# Patient Record
Sex: FEMALE | Race: WHITE | NOT HISPANIC OR LATINO | Employment: UNEMPLOYED | URBAN - METROPOLITAN AREA
[De-identification: names, ages, dates, MRNs, and addresses within clinical notes are randomized per-mention and may not be internally consistent; named-entity substitution may affect disease eponyms.]

---

## 2022-08-31 ENCOUNTER — OFFICE VISIT (OUTPATIENT)
Dept: CARDIOLOGY | Facility: CLINIC | Age: 57
End: 2022-08-31

## 2022-08-31 VITALS
HEIGHT: 60 IN | DIASTOLIC BLOOD PRESSURE: 65 MMHG | SYSTOLIC BLOOD PRESSURE: 114 MMHG | BODY MASS INDEX: 23.75 KG/M2 | WEIGHT: 121 LBS | HEART RATE: 61 BPM | OXYGEN SATURATION: 100 %

## 2022-08-31 DIAGNOSIS — I20.8 STABLE ANGINA PECTORIS: Primary | ICD-10-CM

## 2022-08-31 DIAGNOSIS — I10 PRIMARY HYPERTENSION: ICD-10-CM

## 2022-08-31 PROCEDURE — 99203 OFFICE O/P NEW LOW 30 MIN: CPT | Performed by: INTERNAL MEDICINE

## 2022-08-31 RX ORDER — LEVOTHYROXINE SODIUM 0.12 MG/1
125 TABLET ORAL DAILY
COMMUNITY

## 2022-08-31 RX ORDER — LORAZEPAM 0.5 MG/1
0.5 TABLET ORAL EVERY 8 HOURS PRN
COMMUNITY

## 2022-08-31 RX ORDER — AMLODIPINE BESYLATE 5 MG/1
TABLET ORAL DAILY
COMMUNITY
Start: 2019-02-19

## 2022-08-31 RX ORDER — ALBUTEROL SULFATE 90 UG/1
2 AEROSOL, METERED RESPIRATORY (INHALATION) EVERY 4 HOURS PRN
COMMUNITY

## 2022-08-31 RX ORDER — MIRTAZAPINE 30 MG/1
30 TABLET, FILM COATED ORAL NIGHTLY
COMMUNITY

## 2022-08-31 RX ORDER — BACLOFEN 10 MG/1
10 TABLET ORAL DAILY PRN
COMMUNITY
Start: 2019-02-19

## 2022-08-31 RX ORDER — LORATADINE 10 MG/1
10 CAPSULE, LIQUID FILLED ORAL
COMMUNITY

## 2022-08-31 RX ORDER — MULTIPLE VITAMINS W/ MINERALS TAB 9MG-400MCG
1 TAB ORAL DAILY
COMMUNITY

## 2022-08-31 RX ORDER — NITROGLYCERIN 0.4 MG/1
0.4 TABLET SUBLINGUAL
COMMUNITY

## 2022-08-31 RX ORDER — TOPIRAMATE 50 MG/1
50 TABLET, FILM COATED ORAL 2 TIMES DAILY
COMMUNITY

## 2022-08-31 RX ORDER — CELECOXIB 200 MG/1
CAPSULE ORAL DAILY
COMMUNITY
Start: 2019-02-19

## 2022-08-31 RX ORDER — BUTALBITAL AND ACETAMINOPHEN 50; 325 MG/1; MG/1
TABLET ORAL EVERY 6 HOURS PRN
COMMUNITY

## 2022-08-31 RX ORDER — ESTRADIOL 2 MG/1
TABLET ORAL EVERY 24 HOURS
COMMUNITY
Start: 2015-03-10

## 2022-08-31 RX ORDER — ATENOLOL 25 MG/1
TABLET ORAL EVERY 24 HOURS
COMMUNITY
Start: 2019-02-19

## 2022-09-08 DIAGNOSIS — R07.9 CHRONIC CHEST PAIN WITH LOW TO MODERATE RISK FOR CAD: ICD-10-CM

## 2022-09-08 DIAGNOSIS — I10 ESSENTIAL HYPERTENSION: ICD-10-CM

## 2022-09-08 DIAGNOSIS — R94.31 ABNORMAL EKG: ICD-10-CM

## 2022-09-08 DIAGNOSIS — Z91.89 CHRONIC CHEST PAIN WITH LOW TO MODERATE RISK FOR CAD: ICD-10-CM

## 2022-09-08 DIAGNOSIS — G89.29 CHRONIC CHEST PAIN WITH LOW TO MODERATE RISK FOR CAD: ICD-10-CM

## 2022-09-08 DIAGNOSIS — I20.8 CHRONIC STABLE ANGINA: Primary | ICD-10-CM

## 2022-09-21 ENCOUNTER — APPOINTMENT (OUTPATIENT)
Dept: CARDIOLOGY | Facility: HOSPITAL | Age: 57
End: 2022-09-21

## 2024-03-06 ENCOUNTER — OFFICE (OUTPATIENT)
Dept: URBAN - METROPOLITAN AREA CLINIC 76 | Facility: CLINIC | Age: 59
End: 2024-03-06

## 2024-03-06 VITALS
HEART RATE: 57 BPM | WEIGHT: 123 LBS | OXYGEN SATURATION: 99 % | DIASTOLIC BLOOD PRESSURE: 62 MMHG | SYSTOLIC BLOOD PRESSURE: 102 MMHG | HEIGHT: 60 IN

## 2024-03-06 DIAGNOSIS — R19.4 CHANGE IN BOWEL HABIT: ICD-10-CM

## 2024-03-06 DIAGNOSIS — R19.7 DIARRHEA, UNSPECIFIED: ICD-10-CM

## 2024-03-06 PROCEDURE — 99204 OFFICE O/P NEW MOD 45 MIN: CPT | Performed by: INTERNAL MEDICINE

## 2024-03-06 NOTE — SERVICEHPINOTES
Ms. Vo is a pleasant 59-year-old female with past 2-3 years is had diarrhea, she'll then have constipation and not have a bowel movement and then the symptoms relapse.  She has diarrhea episodes 3 days a week.  She believes a lot of her symptoms started after the Covid vaccine.  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  She denies weight loss.  
br
br She has no upper GI complaints.  Is no reflux, there is no dysphagia, odynophagia nausea or vomiting.  There is no documented fevers or chills, she has subjective fevers.  There is no blood in the bowels.  She was on some medications which she says cause diarrhea at one point, the medicines were stopped but she doesn't recall what medicines she was on.  Family history is negative for polyps or colon cancer.  There is no travel or well water use.  No one else is been ill.  There is no bleeding or nocturnal symptoms.  Her CRP and GAYATRI are elevated consistent with her history of autoimmune disease.  She doesn't drink or smoke.  She's had prior hysterectomy.  She is in no acute distress.

## 2024-04-04 ENCOUNTER — PROCEDURE VISIT (OUTPATIENT)
Dept: NEUROLOGY | Facility: CLINIC | Age: 59
End: 2024-04-04
Payer: COMMERCIAL

## 2024-04-04 VITALS
HEIGHT: 60 IN | DIASTOLIC BLOOD PRESSURE: 86 MMHG | BODY MASS INDEX: 23.75 KG/M2 | HEART RATE: 63 BPM | WEIGHT: 121 LBS | SYSTOLIC BLOOD PRESSURE: 121 MMHG

## 2024-04-04 DIAGNOSIS — M79.641 PAIN IN BOTH HANDS: Primary | ICD-10-CM

## 2024-04-04 DIAGNOSIS — M79.642 PAIN IN BOTH HANDS: Primary | ICD-10-CM

## 2024-04-04 DIAGNOSIS — R29.898 WEAKNESS OF BOTH UPPER EXTREMITIES: ICD-10-CM

## 2024-04-04 PROBLEM — G47.00 INSOMNIA: Status: ACTIVE | Noted: 2017-12-28

## 2024-04-04 PROBLEM — K58.9 IRRITABLE BOWEL SYNDROME: Status: ACTIVE | Noted: 2024-04-04

## 2024-04-04 PROBLEM — G43.119 INTRACTABLE MIGRAINE WITH AURA WITHOUT STATUS MIGRAINOSUS: Status: ACTIVE | Noted: 2018-03-26

## 2024-04-04 PROBLEM — F32.9 MAJOR DEPRESSIVE DISORDER, SINGLE EPISODE, UNSPECIFIED: Status: ACTIVE | Noted: 2024-04-04

## 2024-04-04 PROBLEM — K50.90 CROHN'S DISEASE: Status: ACTIVE | Noted: 2024-04-04

## 2024-04-04 PROBLEM — E07.9 THYROID DISEASE: Status: ACTIVE | Noted: 2024-04-04

## 2024-04-04 PROBLEM — G44.40 MEDICATION OVERUSE HEADACHE: Status: ACTIVE | Noted: 2018-03-26

## 2024-04-04 PROBLEM — I10 HYPERTENSION: Status: ACTIVE | Noted: 2019-02-19

## 2024-04-04 RX ORDER — FERROUS SULFATE 325(65) MG
1 TABLET ORAL EVERY 12 HOURS SCHEDULED
COMMUNITY
Start: 2024-03-20

## 2024-04-04 RX ORDER — FLUOXETINE HYDROCHLORIDE 40 MG/1
1 CAPSULE ORAL DAILY
COMMUNITY
Start: 2024-02-01

## 2024-04-04 RX ORDER — PRAVASTATIN SODIUM 10 MG
TABLET ORAL
COMMUNITY

## 2024-04-04 RX ORDER — RIZATRIPTAN BENZOATE 10 MG/1
TABLET ORAL
COMMUNITY
Start: 2024-03-12

## 2025-02-12 ENCOUNTER — APPOINTMENT (OUTPATIENT)
Dept: CT IMAGING | Facility: HOSPITAL | Age: 60
End: 2025-02-12
Payer: COMMERCIAL

## 2025-02-12 ENCOUNTER — APPOINTMENT (OUTPATIENT)
Dept: GENERAL RADIOLOGY | Facility: HOSPITAL | Age: 60
End: 2025-02-12
Payer: COMMERCIAL

## 2025-02-12 ENCOUNTER — HOSPITAL ENCOUNTER (EMERGENCY)
Facility: HOSPITAL | Age: 60
Discharge: HOME | End: 2025-02-12
Attending: STUDENT IN AN ORGANIZED HEALTH CARE EDUCATION/TRAINING PROGRAM | Admitting: STUDENT IN AN ORGANIZED HEALTH CARE EDUCATION/TRAINING PROGRAM
Payer: COMMERCIAL

## 2025-02-12 VITALS
HEIGHT: 60 IN | DIASTOLIC BLOOD PRESSURE: 80 MMHG | TEMPERATURE: 98 F | RESPIRATION RATE: 16 BRPM | SYSTOLIC BLOOD PRESSURE: 117 MMHG | HEART RATE: 76 BPM | WEIGHT: 100 LBS | OXYGEN SATURATION: 99 % | BODY MASS INDEX: 19.63 KG/M2

## 2025-02-12 DIAGNOSIS — S43.401A SPRAIN OF RIGHT SHOULDER, UNSPECIFIED SHOULDER SPRAIN TYPE, INITIAL ENCOUNTER: ICD-10-CM

## 2025-02-12 DIAGNOSIS — S09.90XA INJURY OF HEAD, INITIAL ENCOUNTER: Primary | ICD-10-CM

## 2025-02-12 DIAGNOSIS — S13.9XXA CERVICAL SPRAIN, INITIAL ENCOUNTER: ICD-10-CM

## 2025-02-12 DIAGNOSIS — S53.401A SPRAIN OF RIGHT ELBOW, INITIAL ENCOUNTER: ICD-10-CM

## 2025-02-12 PROCEDURE — 72125 CT NECK SPINE W/O DYE: CPT

## 2025-02-12 PROCEDURE — 73080 X-RAY EXAM OF ELBOW: CPT

## 2025-02-12 PROCEDURE — 63710000001 ONDANSETRON ODT 4 MG TABLET DISPERSIBLE: Performed by: STUDENT IN AN ORGANIZED HEALTH CARE EDUCATION/TRAINING PROGRAM

## 2025-02-12 PROCEDURE — 70450 CT HEAD/BRAIN W/O DYE: CPT

## 2025-02-12 PROCEDURE — 73030 X-RAY EXAM OF SHOULDER: CPT

## 2025-02-12 PROCEDURE — 99284 EMERGENCY DEPT VISIT MOD MDM: CPT | Performed by: STUDENT IN AN ORGANIZED HEALTH CARE EDUCATION/TRAINING PROGRAM

## 2025-02-12 RX ORDER — PREDNISONE 20 MG/1
TABLET ORAL
Qty: 18 TABLET | Refills: 0 | Status: SHIPPED | OUTPATIENT
Start: 2025-02-12 | End: 2025-02-20

## 2025-02-12 RX ORDER — ONDANSETRON 4 MG/1
4 TABLET, ORALLY DISINTEGRATING ORAL ONCE
Status: COMPLETED | OUTPATIENT
Start: 2025-02-12 | End: 2025-02-12

## 2025-02-12 RX ORDER — HYDROCODONE BITARTRATE AND ACETAMINOPHEN 5; 325 MG/1; MG/1
1 TABLET ORAL ONCE
Status: COMPLETED | OUTPATIENT
Start: 2025-02-12 | End: 2025-02-12

## 2025-02-12 RX ADMIN — HYDROCODONE BITARTRATE AND ACETAMINOPHEN 1 TABLET: 5; 325 TABLET ORAL at 18:57

## 2025-02-12 RX ADMIN — ONDANSETRON 4 MG: 4 TABLET, ORALLY DISINTEGRATING ORAL at 18:57

## 2025-02-12 NOTE — ED PROVIDER NOTES
Subjective   History of Present Illness  60-year-old female presents to the ER stating that approximately 4 months ago she fell off of a ladder on the 10th rung.  When she fell off she got her arm and her leg wrapped up in the ladder and she fell all the way down.  She has had neck pain initially when she most likely hit her head and now she has pain that radiates from her shoulder down past her elbow.  She has not seen anybody for this.  Not on any blood thinners.  Currently allergic to Augmentin.  No vision changes are noted.  No nausea or vomiting.        Review of Systems no fever chills cough congestion chest pain or shortness of breath.  No nausea vomiting diarrhea or abdominal pain.  Positive neck pain shoulder pain and right elbow pain with radicular-like symptoms.    Past Medical History:   Diagnosis Date    Abnormal ECG     Hypertension     Lupus     Raynaud's disease        Allergies   Allergen Reactions    Augmentin [Amoxicillin-Pot Clavulanate] Hives and GI Intolerance       Past Surgical History:   Procedure Laterality Date    HYSTERECTOMY         Family History   Problem Relation Age of Onset    Heart attack Mother        Social History     Socioeconomic History    Marital status:    Tobacco Use    Smoking status: Former     Current packs/day: 0.00     Types: Cigarettes     Quit date:      Years since quittin.1           Objective   Physical Exam  General: Mild distress  Head: Atraumatic normocephalic, no crepitus pressure hematoma  EENT: TMs and external auditory canals are without erythema edema or exudate.  EOMI PERRL.  Nares patent.  Tonsils are without erythema edema or exudate bilaterally and uvula is midline.  Neck: No lymphadenopathy.  Spine: Cervical spine tender to palpation over C4-5-6 without step-off or deformity.  Thoracic and lumbar spines are without step-off deformity or crepitus.  Chest: Clear to auscultation bilaterally without wheezing rhonchi or  rales  Cardiovascular: Regular rate and rhythm without murmur no edema  Abdominal: Soft nontender good bowel sounds no hernia  Skin: No abrasions bruising or lesions.  Musculoskeletal: Full active range of motion of upper and lower extremities with good distal pulses.  Right shoulder tender to palpation anteriorly without gross deformity or edema, minimal pain with range of motion.  Right elbow tender to palpation over the l lateral condyle and minimal pain with range of motion.  Wrist exam is negative with full active range of motion nontender and good distal pulses.    Neuro: Alert and oriented x 3  Psych: Cooperative      Procedures           ED Course                                                       Medical Decision Making  Patient presented to the ER after she fell off of a ladder 4 months ago and has had worsening pain since.  She had a CT of the head and CT of the C-spine which both were negative.  Pending results for right elbow and shoulder.  Will refer to spine and orthopedic clinic once results are back.  CT of the C-spine, CT head and x-rays of the shoulder and elbow are negative..  Will discharge home with medications.      Differential diagnosis: Acute brain bleed, cervical spine fracture, right shoulder dislocation, right elbow contusion    Problems Addressed:  Cervical sprain, initial encounter: complicated acute illness or injury  Injury of head, initial encounter: complicated acute illness or injury  Sprain of right elbow, initial encounter: complicated acute illness or injury  Sprain of right shoulder, unspecified shoulder sprain type, initial encounter: complicated acute illness or injury    Amount and/or Complexity of Data Reviewed  Radiology: ordered.    Risk  Prescription drug management.        Final diagnoses:   Injury of head, initial encounter   Cervical sprain, initial encounter   Sprain of right shoulder, unspecified shoulder sprain type, initial encounter   Sprain of right elbow,  initial encounter       ED Disposition  ED Disposition       ED Disposition   Discharge    Condition   Stable    Comment   --               Mike Alves MD  [address] [id number] [phone]               Medication List        New Prescriptions      predniSONE 20 MG tablet  Commonly known as: DELTASONE  Take 3 tablets by mouth See Admin Instructions for 3 days, THEN 2 tablets See Admin Instructions for 3 days, THEN 1 tablet See Admin Instructions for 3 days.  Start taking on: February 12, 2025               Where to Get Your Medications        These medications were sent to [facility] [address] [place] [address] - [phone]  - [phone]   [address] [place] [id number]      Phone: [phone]   predniSONE 20 MG tablet            Ronal Bueno PA-C  02/12/25 2043

## 2025-02-13 NOTE — DISCHARGE INSTRUCTIONS
X-rays and CTs are negative.  Call the referral above to see Dr. Alves.  Prednisone Dosepak as prescribed.  Call to set up this appointment.

## 2025-04-08 ENCOUNTER — TRANSCRIBE ORDERS (OUTPATIENT)
Dept: ADMINISTRATIVE | Facility: HOSPITAL | Age: 60
End: 2025-04-08
Payer: COMMERCIAL

## 2025-04-08 DIAGNOSIS — M54.12 CERVICAL RADICULOPATHY: ICD-10-CM

## 2025-04-08 DIAGNOSIS — M25.511 RIGHT SHOULDER PAIN, UNSPECIFIED CHRONICITY: Primary | ICD-10-CM

## 2025-04-28 ENCOUNTER — HOSPITAL ENCOUNTER (OUTPATIENT)
Dept: MRI IMAGING | Facility: HOSPITAL | Age: 60
Discharge: HOME | End: 2025-04-28
Admitting: ORTHOPAEDIC SURGERY
Payer: COMMERCIAL

## 2025-04-28 DIAGNOSIS — M54.12 CERVICAL RADICULOPATHY: ICD-10-CM

## 2025-04-28 DIAGNOSIS — M25.511 RIGHT SHOULDER PAIN, UNSPECIFIED CHRONICITY: ICD-10-CM

## 2025-04-28 PROCEDURE — 73221 MRI JOINT UPR EXTREM W/O DYE: CPT
